# Patient Record
(demographics unavailable — no encounter records)

---

## 2025-04-18 NOTE — REASON FOR VISIT
[Subsequent Evaluation] : a subsequent evaluation for [FreeTextEntry2] : deviated septum, nasal obstruction

## 2025-04-18 NOTE — HISTORY OF PRESENT ILLNESS
[FreeTextEntry1] : Pt returns today c/o deviated septum, nasal obstruction. Accompanied by parents. States that he has trouble breathing from nose at night when he is trying to sleep. C/o of snoring, denies nighttime awakening, apneic episodes or restlessness. Having daytime fatigue at times. Uses nasal spray with temporary improvement. Reports nasal injury when he was younger, was hit in face by soccer ball, denies getting imaging done at that time.

## 2025-04-18 NOTE — ASSESSMENT
[FreeTextEntry1] : I discussed with the patient pros and cons and alternatives to a nasal septoplasty and turbinates reduction with outfracture, in addition to ablation of any swollen tissue having an impact of patient's breathing. I discussed the risks of bleeding, CSF leak, infection, orbital injury, nasal pain, risk of recurrence of the obstruction...

## 2025-05-12 NOTE — REASON FOR VISIT
[Subsequent Evaluation] : a subsequent evaluation for [FreeTextEntry2] : s/p septoplasty nasal surgery 05/06/2025

## 2025-05-22 NOTE — HISTORY OF PRESENT ILLNESS
[FreeTextEntry1] : Patient here today s/p nasal surgery 05/06/2025.  Patient is breathing well post operative

## 2025-06-13 NOTE — HISTORY OF PRESENT ILLNESS
[FreeTextEntry1] : Patient returns today for a follow up on post-operative visit s/p nasal surgery 05/06/2025. States that he been feeling good since the surgery. He is not having any pain today. Breathing is much better after surgery. here to follow up.